# Patient Record
Sex: FEMALE | URBAN - METROPOLITAN AREA
[De-identification: names, ages, dates, MRNs, and addresses within clinical notes are randomized per-mention and may not be internally consistent; named-entity substitution may affect disease eponyms.]

---

## 2020-06-03 ENCOUNTER — OFFICE VISIT (OUTPATIENT)
Dept: URBAN - METROPOLITAN AREA CLINIC 98 | Facility: CLINIC | Age: 50
End: 2020-06-03

## 2020-06-03 RX ORDER — PANTOPRAZOLE SODIUM 40 MG/1
TAKE 1 TABLET (40 MG) BY ORAL ROUTE ONCE DAILY FOR 30 DAYS TABLET, DELAYED RELEASE ORAL 1
Qty: 30 | Refills: 3 | Status: ACTIVE | COMMUNITY
Start: 2020-03-16 | End: 2020-07-14

## 2020-06-03 RX ORDER — LISINOPRIL 30 MG/1
TAKE 1 TABLET (30 MG) BY ORAL ROUTE ONCE DAILY TABLET ORAL 1
Qty: 0 | Refills: 0 | Status: ACTIVE | COMMUNITY
Start: 1900-01-01 | End: 1900-01-01

## 2020-06-03 RX ORDER — OMEPRAZOLE 40 MG/1
TAKE 1 CAPSULE (40 MG) BY ORAL ROUTE ONCE DAILY BEFORE A MEAL CAPSULE, DELAYED RELEASE PELLETS ORAL 1
Qty: 0 | Refills: 0 | Status: ACTIVE | COMMUNITY
Start: 1900-01-01 | End: 1900-01-01

## 2020-06-08 ENCOUNTER — ERX REFILL RESPONSE (OUTPATIENT)
Age: 50
End: 2020-06-08

## 2020-06-08 RX ORDER — PANTOPRAZOLE SODIUM 40 MG/1
TAKE 1 TABLET BY MOUTH EVERY DAY TABLET, DELAYED RELEASE ORAL
Qty: 30 | Refills: 3 | OUTPATIENT

## 2020-06-08 RX ORDER — PANTOPRAZOLE SODIUM 40 MG/1
TAKE 1 TABLET BY MOUTH EVERY DAY TABLET, DELAYED RELEASE ORAL
Qty: 90 TABLET | Refills: 2 | OUTPATIENT

## 2020-07-07 ENCOUNTER — OFFICE VISIT (OUTPATIENT)
Dept: URBAN - METROPOLITAN AREA CLINIC 98 | Facility: CLINIC | Age: 50
End: 2020-07-07
Payer: SELF-PAY

## 2020-07-07 DIAGNOSIS — K57.30 COLON, DIVERTICULOSIS: ICD-10-CM

## 2020-07-07 DIAGNOSIS — K21.0 GASTROESOPHAGEAL REFLUX DISEASE (GERD): ICD-10-CM

## 2020-07-07 PROCEDURE — 99214 OFFICE O/P EST MOD 30 MIN: CPT | Performed by: INTERNAL MEDICINE

## 2020-07-07 PROCEDURE — G8420 CALC BMI NORM PARAMETERS: HCPCS | Performed by: INTERNAL MEDICINE

## 2020-07-07 PROCEDURE — G8427 DOCREV CUR MEDS BY ELIG CLIN: HCPCS | Performed by: INTERNAL MEDICINE

## 2020-07-07 PROCEDURE — G9903 PT SCRN TBCO ID AS NON USER: HCPCS | Performed by: INTERNAL MEDICINE

## 2020-07-07 PROCEDURE — 3017F COLORECTAL CA SCREEN DOC REV: CPT | Performed by: INTERNAL MEDICINE

## 2020-07-07 PROCEDURE — 1036F TOBACCO NON-USER: CPT | Performed by: INTERNAL MEDICINE

## 2020-07-07 RX ORDER — PANTOPRAZOLE SODIUM 40 MG/1
TAKE 1 TABLET BY MOUTH EVERY DAY TABLET, DELAYED RELEASE ORAL
Qty: 90 TABLET | Refills: 2 | Status: ACTIVE | COMMUNITY

## 2020-07-07 RX ORDER — LISINOPRIL 30 MG/1
TAKE 1 TABLET (30 MG) BY ORAL ROUTE ONCE DAILY TABLET ORAL 1
Qty: 0 | Refills: 0 | Status: ACTIVE | COMMUNITY
Start: 1900-01-01 | End: 1900-01-01

## 2020-07-07 RX ORDER — OMEPRAZOLE 40 MG/1
TAKE 1 CAPSULE (40 MG) BY ORAL ROUTE ONCE DAILY BEFORE A MEAL CAPSULE, DELAYED RELEASE PELLETS ORAL 1
Qty: 0 | Refills: 0 | Status: ACTIVE | COMMUNITY
Start: 1900-01-01 | End: 1900-01-01

## 2020-07-07 NOTE — HPI-TODAY'S VISIT:
50 yo patient for abdominal pain follow up. She has no c/o abdominal pain nor jim GHASSAN sxs on diet and Pantoprazole. She does c/o globus sensation while eating w/o dysphagia / odynopphagia. She has been compliant w/  diet and Rx. Admits being under significant stress. No new c/o since prior visit. EGD: GERD, reactive Hp-negative gastritis and  normal duodenal bx's. Colonoscopy: pan-diverticulosis and small hrrds.

## 2020-09-08 ENCOUNTER — OFFICE VISIT (OUTPATIENT)
Dept: URBAN - METROPOLITAN AREA CLINIC 98 | Facility: CLINIC | Age: 50
End: 2020-09-08

## 2020-09-08 ENCOUNTER — OFFICE VISIT (OUTPATIENT)
Dept: URBAN - METROPOLITAN AREA TELEHEALTH 2 | Facility: TELEHEALTH | Age: 50
End: 2020-09-08
Payer: SELF-PAY

## 2020-09-08 DIAGNOSIS — K21.0 GASTROESOPHAGEAL REFLUX DISEASE (GERD): ICD-10-CM

## 2020-09-08 PROCEDURE — G8427 DOCREV CUR MEDS BY ELIG CLIN: HCPCS | Performed by: INTERNAL MEDICINE

## 2020-09-08 PROCEDURE — 3017F COLORECTAL CA SCREEN DOC REV: CPT | Performed by: INTERNAL MEDICINE

## 2020-09-08 PROCEDURE — G9903 PT SCRN TBCO ID AS NON USER: HCPCS | Performed by: INTERNAL MEDICINE

## 2020-09-08 PROCEDURE — G8422 PT INELIG BMI CALCULATION: HCPCS | Performed by: INTERNAL MEDICINE

## 2020-09-08 PROCEDURE — 1036F TOBACCO NON-USER: CPT | Performed by: INTERNAL MEDICINE

## 2020-09-08 PROCEDURE — 99214 OFFICE O/P EST MOD 30 MIN: CPT | Performed by: INTERNAL MEDICINE

## 2020-09-08 RX ORDER — OMEPRAZOLE 40 MG/1
TAKE 1 CAPSULE (40 MG) BY ORAL ROUTE ONCE DAILY BEFORE A MEAL CAPSULE, DELAYED RELEASE PELLETS ORAL 1
Qty: 0 | Refills: 0 | Status: ACTIVE | COMMUNITY
Start: 1900-01-01

## 2020-09-08 RX ORDER — PANTOPRAZOLE SODIUM 40 MG/1
TAKE 1 TABLET BY MOUTH EVERY DAY TABLET, DELAYED RELEASE ORAL
Qty: 90 TABLET | Refills: 2 | Status: ACTIVE | COMMUNITY

## 2020-09-08 RX ORDER — LISINOPRIL 30 MG/1
TAKE 1 TABLET (30 MG) BY ORAL ROUTE ONCE DAILY TABLET ORAL 1
Qty: 0 | Refills: 0 | Status: ACTIVE | COMMUNITY
Start: 1900-01-01

## 2020-09-08 NOTE — HPI-TODAY'S VISIT:
This is a Telephone OV to which patient has agreed to. 48 yo pt w/ GHASSAN here for F/u. She has been feeling better, no abdominal pain, less GHASSAN sxs, some residual sore throat p-prandial w/o dysphagia / odynophagia and no change in bowel pattern. Denies fever or chills. About two weeks ago, she had not feeeling well overall. Had a + COVID-19 test and has been quarantine since then; this week is her second week. She has  no fever, chills, dyspnea, cough nor pleuritic pain. Her son tested + for COVID-19 as well ans is quarantinened at home in  a different room . She has been on Pantoprazole qd. Recent EGD: GERD w/o BE, Hp-negative reactive gastritis, fundic gland polyp and normal duodenal bx's. Colonoscopy 3/20: pandiverticulosis. No other complaints after extensive ROS.

## 2020-10-22 PROBLEM — 60728008 BLOATING: Status: ACTIVE | Noted: 2020-10-22

## 2020-10-24 ENCOUNTER — OFFICE VISIT (OUTPATIENT)
Dept: URBAN - METROPOLITAN AREA TELEHEALTH 2 | Facility: TELEHEALTH | Age: 50
End: 2020-10-24
Payer: SELF-PAY

## 2020-10-24 DIAGNOSIS — K57.30 COLON, DIVERTICULOSIS: ICD-10-CM

## 2020-10-24 DIAGNOSIS — K21.9 GERD (GASTROESOPHAGEAL REFLUX DISEASE): ICD-10-CM

## 2020-10-24 PROCEDURE — G9903 PT SCRN TBCO ID AS NON USER: HCPCS | Performed by: INTERNAL MEDICINE

## 2020-10-24 PROCEDURE — G8484 FLU IMMUNIZE NO ADMIN: HCPCS | Performed by: INTERNAL MEDICINE

## 2020-10-24 PROCEDURE — G8427 DOCREV CUR MEDS BY ELIG CLIN: HCPCS | Performed by: INTERNAL MEDICINE

## 2020-10-24 PROCEDURE — G8422 PT INELIG BMI CALCULATION: HCPCS | Performed by: INTERNAL MEDICINE

## 2020-10-24 PROCEDURE — 1036F TOBACCO NON-USER: CPT | Performed by: INTERNAL MEDICINE

## 2020-10-24 PROCEDURE — 99213 OFFICE O/P EST LOW 20 MIN: CPT | Performed by: INTERNAL MEDICINE

## 2020-10-24 PROCEDURE — 3017F COLORECTAL CA SCREEN DOC REV: CPT | Performed by: INTERNAL MEDICINE

## 2020-10-24 RX ORDER — LISINOPRIL 30 MG/1
TAKE 1 TABLET (30 MG) BY ORAL ROUTE ONCE DAILY TABLET ORAL 1
Qty: 0 | Refills: 0 | Status: ACTIVE | COMMUNITY
Start: 1900-01-01

## 2020-10-24 RX ORDER — PANTOPRAZOLE SODIUM 40 MG/1
TAKE 1 TABLET BY MOUTH EVERY DAY TABLET, DELAYED RELEASE ORAL
Qty: 90 TABLET | Refills: 2 | Status: ACTIVE | COMMUNITY

## 2020-10-24 RX ORDER — OMEPRAZOLE 40 MG/1
TAKE 1 CAPSULE (40 MG) BY ORAL ROUTE ONCE DAILY BEFORE A MEAL CAPSULE, DELAYED RELEASE PELLETS ORAL 1
Qty: 0 | Refills: 0 | Status: ACTIVE | COMMUNITY
Start: 1900-01-01

## 2020-10-24 RX ORDER — PANTOPRAZOLE SODIUM 40 MG/1
1 TABLET TABLET, DELAYED RELEASE ORAL ONCE A DAY
Qty: 30 | Refills: 3 | OUTPATIENT
Start: 2020-10-24

## 2020-10-24 NOTE — HPI-TODAY'S VISIT:
This is a Telephone OV to which patient has agreed to. 48 yo pt w/ GHASSAN, w/o alarm nor extraesophageal sxs, much better w/ diet and Pantoprazole qd. Mild p-prandial gas and distention. No constitutional sxs. No melenic stools. Compliant w/ diet. No other complaints. Recent EGD: GERD w/o BE, Hp-negative reactive gastritis and normal duodenal bx's. No constitutional sxs and denies anorexia or weight loss. No fever nor cardiorespiratory sxs. Has completed quarantine after her son was COVID-19 +. She did not develop any sxs. Colonoscopy 3/20: pandiverticulosis. No constipation on high fiber diet.  No other complaints after extensive ROS.

## 2020-12-07 ENCOUNTER — OFFICE VISIT (OUTPATIENT)
Dept: URBAN - METROPOLITAN AREA CLINIC 98 | Facility: CLINIC | Age: 50
End: 2020-12-07

## 2021-01-21 ENCOUNTER — ERX REFILL RESPONSE (OUTPATIENT)
Dept: URBAN - METROPOLITAN AREA TELEHEALTH 2 | Facility: TELEHEALTH | Age: 51
End: 2021-01-21

## 2021-01-21 RX ORDER — PANTOPRAZOLE SODIUM 40 MG/1
1 TABLET TABLET, DELAYED RELEASE ORAL ONCE A DAY
Qty: 90 | Refills: 1

## 2021-05-02 ENCOUNTER — P2P PATIENT RECORD (OUTPATIENT)
Age: 51
End: 2021-05-02

## 2021-08-20 ENCOUNTER — TELEPHONE ENCOUNTER (OUTPATIENT)
Dept: URBAN - METROPOLITAN AREA CLINIC 98 | Facility: CLINIC | Age: 51
End: 2021-08-20

## 2021-08-20 RX ORDER — PANTOPRAZOLE SODIUM 40 MG/1
1 TABLET TABLET, DELAYED RELEASE ORAL ONCE A DAY
Qty: 90 TABLET | Refills: 3

## 2021-10-02 ENCOUNTER — OFFICE VISIT (OUTPATIENT)
Dept: URBAN - METROPOLITAN AREA TELEHEALTH 2 | Facility: TELEHEALTH | Age: 51
End: 2021-10-02
Payer: SELF-PAY

## 2021-10-02 DIAGNOSIS — K21.9 GERD (GASTROESOPHAGEAL REFLUX DISEASE): ICD-10-CM

## 2021-10-02 DIAGNOSIS — K57.30 COLON, DIVERTICULOSIS: ICD-10-CM

## 2021-10-02 PROCEDURE — 99214 OFFICE O/P EST MOD 30 MIN: CPT | Performed by: INTERNAL MEDICINE

## 2021-10-02 RX ORDER — OMEPRAZOLE 40 MG/1
TAKE 1 CAPSULE (40 MG) BY ORAL ROUTE ONCE DAILY BEFORE A MEAL CAPSULE, DELAYED RELEASE PELLETS ORAL 1
Qty: 0 | Refills: 0 | Status: ACTIVE | COMMUNITY
Start: 1900-01-01

## 2021-10-02 RX ORDER — LISINOPRIL 30 MG/1
TAKE 1 TABLET (30 MG) BY ORAL ROUTE ONCE DAILY TABLET ORAL 1
Qty: 0 | Refills: 0 | Status: ACTIVE | COMMUNITY
Start: 1900-01-01

## 2021-10-02 RX ORDER — LANSOPRAZOLE 30 MG
1 CAPSULE BEFORE A MEAL CAPSULE,DELAYED RELEASE (ENTERIC COATED) ORAL ONCE A DAY
Qty: 30 | Refills: 3 | OUTPATIENT
Start: 2021-10-03

## 2021-10-02 RX ORDER — PANTOPRAZOLE SODIUM 40 MG/1
1 TABLET TABLET, DELAYED RELEASE ORAL ONCE A DAY
Qty: 90 TABLET | Refills: 3 | Status: ACTIVE | COMMUNITY

## 2021-10-02 NOTE — HPI-TODAY'S VISIT:
This is a Telehealth  OV to which patient has agreed to.  Limitations of telehealth discussed; she understands and agrees to proceed. 51 yo pt w/ GHASSAN, w/o alarm nor extraesophageal sxs, much better w/ diet and Pantoprazole qd.  EGD: GERD, sm HH , chronic Hp-negaive gastritis. Has been c/o of globus sensation, w/ occasional sore throat and no dysphagia / odynophagia. Mild p-prandial gas, burping, throat clearing  and distention w/o N/V. Has been under significant stress lately. No constitutional sxs. No melenic stools. Compliant w/ healthy diet. Pantoprazole ineffective in controlling sxs. No other complaints.. No fever nor cardiorespiratory sxs Colonoscopy 3/20: pandiverticulosis. No constipation on high fiber diet.  No other complaints after extensive ROS. Mild COVID-19 9/20 and has received 2 doses of COVID-19 vaccine.

## 2021-10-04 ENCOUNTER — TELEPHONE ENCOUNTER (OUTPATIENT)
Dept: URBAN - METROPOLITAN AREA CLINIC 98 | Facility: CLINIC | Age: 51
End: 2021-10-04

## 2021-10-26 ENCOUNTER — OFFICE VISIT (OUTPATIENT)
Dept: URBAN - METROPOLITAN AREA TELEHEALTH 2 | Facility: TELEHEALTH | Age: 51
End: 2021-10-26
Payer: SELF-PAY

## 2021-10-26 DIAGNOSIS — K21.9 GERD (GASTROESOPHAGEAL REFLUX DISEASE): ICD-10-CM

## 2021-10-26 DIAGNOSIS — K57.30 COLON, DIVERTICULOSIS: ICD-10-CM

## 2021-10-26 PROCEDURE — 99214 OFFICE O/P EST MOD 30 MIN: CPT | Performed by: INTERNAL MEDICINE

## 2021-10-26 RX ORDER — LISINOPRIL 30 MG/1
TAKE 1 TABLET (30 MG) BY ORAL ROUTE ONCE DAILY TABLET ORAL 1
Qty: 0 | Refills: 0 | Status: ACTIVE | COMMUNITY
Start: 1900-01-01

## 2021-10-26 RX ORDER — PANTOPRAZOLE SODIUM 40 MG/1
1 TABLET TABLET, DELAYED RELEASE ORAL ONCE A DAY
Qty: 90 TABLET | Refills: 3 | Status: ACTIVE | COMMUNITY

## 2021-10-26 RX ORDER — LANSOPRAZOLE 30 MG
1 CAPSULE BEFORE A MEAL CAPSULE,DELAYED RELEASE (ENTERIC COATED) ORAL ONCE A DAY
Qty: 30 | Refills: 3 | Status: ACTIVE | COMMUNITY
Start: 2021-10-03

## 2021-10-26 RX ORDER — PANTOPRAZOLE SODIUM 40 MG/1
1 TABLET TABLET, DELAYED RELEASE ORAL ONCE A DAY
Qty: 30 | Refills: 3 | OUTPATIENT
Start: 2021-10-29

## 2021-10-26 RX ORDER — OMEPRAZOLE 40 MG/1
TAKE 1 CAPSULE (40 MG) BY ORAL ROUTE ONCE DAILY BEFORE A MEAL CAPSULE, DELAYED RELEASE PELLETS ORAL 1
Qty: 0 | Refills: 0 | Status: ACTIVE | COMMUNITY
Start: 1900-01-01

## 2021-10-26 NOTE — HPI-TODAY'S VISIT:
This is a Telehealth  OV to which patient has agreed to.  Limitations of telehealth discussed; she understands and agrees to proceed. 51 yo pt w/ GHASSAN, w/o alarm nor extraesophageal sxs, much better w/ diet and Pantoprazole qd. Occasional globus sensation wo dysphagia / odynophagia.   EGD 3/20: GERD, sm HH , chronic Hp-negative gastritis.  Mild p-prandial gas, burping, throat clearing  and distention w/o N/V. Has been under significant stress lately. No constitutional sxs. No melenic stools. Compliant w/ healthy diet. Pantoprazole ineffective in controlling sxs. No other complaints.. No fever nor cardiorespiratory sxs Colonoscopy 3/20: pandiverticulosis. No constipation on high fiber diet.  No other complaints after extensive ROS. Mild COVID-19 9/20 and has received 2 doses of COVID-19 vaccine.

## 2021-11-01 ENCOUNTER — TELEPHONE ENCOUNTER (OUTPATIENT)
Dept: URBAN - METROPOLITAN AREA CLINIC 92 | Facility: CLINIC | Age: 51
End: 2021-11-01

## 2021-11-01 RX ORDER — LANSOPRAZOLE 30 MG
1 CAPSULE BEFORE A MEAL CAPSULE,DELAYED RELEASE (ENTERIC COATED) ORAL ONCE A DAY
Qty: 30 | Refills: 3 | OUTPATIENT
Start: 2021-11-01

## 2021-11-16 ENCOUNTER — OFFICE VISIT (OUTPATIENT)
Dept: URBAN - METROPOLITAN AREA CLINIC 97 | Facility: CLINIC | Age: 51
End: 2021-11-16
Payer: SELF-PAY

## 2021-11-16 ENCOUNTER — TELEPHONE ENCOUNTER (OUTPATIENT)
Dept: URBAN - METROPOLITAN AREA CLINIC 98 | Facility: CLINIC | Age: 51
End: 2021-11-16

## 2021-11-16 DIAGNOSIS — K76.0 FATTY LIVER: ICD-10-CM

## 2021-11-16 DIAGNOSIS — K21.9 GERD: ICD-10-CM

## 2021-11-16 PROCEDURE — 76700 US EXAM ABDOM COMPLETE: CPT | Performed by: INTERNAL MEDICINE

## 2021-11-16 RX ORDER — OMEPRAZOLE 40 MG/1
TAKE 1 CAPSULE (40 MG) BY ORAL ROUTE ONCE DAILY BEFORE A MEAL CAPSULE, DELAYED RELEASE PELLETS ORAL 1
Qty: 0 | Refills: 0 | Status: ACTIVE | COMMUNITY
Start: 1900-01-01

## 2021-11-16 RX ORDER — LANSOPRAZOLE 30 MG
1 CAPSULE BEFORE A MEAL CAPSULE,DELAYED RELEASE (ENTERIC COATED) ORAL ONCE A DAY
Qty: 30 | Refills: 3 | Status: ACTIVE | COMMUNITY
Start: 2021-11-01

## 2021-11-16 RX ORDER — LISINOPRIL 30 MG/1
TAKE 1 TABLET (30 MG) BY ORAL ROUTE ONCE DAILY TABLET ORAL 1
Qty: 0 | Refills: 0 | Status: ACTIVE | COMMUNITY
Start: 1900-01-01

## 2021-11-16 RX ORDER — PANTOPRAZOLE SODIUM 40 MG/1
1 TABLET TABLET, DELAYED RELEASE ORAL ONCE A DAY
Qty: 90 TABLET | Refills: 3 | Status: ACTIVE | COMMUNITY

## 2021-11-16 RX ORDER — PANTOPRAZOLE SODIUM 40 MG/1
1 TABLET TABLET, DELAYED RELEASE ORAL ONCE A DAY
Qty: 30 | Refills: 3 | Status: ACTIVE | COMMUNITY
Start: 2021-10-29

## 2021-11-16 RX ORDER — LANSOPRAZOLE 30 MG
1 CAPSULE BEFORE A MEAL CAPSULE,DELAYED RELEASE (ENTERIC COATED) ORAL ONCE A DAY
Qty: 30 | Refills: 3 | Status: ACTIVE | COMMUNITY
Start: 2021-10-03

## 2022-02-01 ENCOUNTER — TELEPHONE ENCOUNTER (OUTPATIENT)
Dept: URBAN - METROPOLITAN AREA CLINIC 98 | Facility: CLINIC | Age: 52
End: 2022-02-01

## 2022-02-01 RX ORDER — ESOMEPRAZOLE MAGNESIUM 40 MG/1
1 CAPSULE CAPSULE, DELAYED RELEASE ORAL ONCE A DAY
Qty: 30 | Refills: 3 | OUTPATIENT
Start: 2022-02-03

## 2022-02-03 ENCOUNTER — TELEPHONE ENCOUNTER (OUTPATIENT)
Dept: URBAN - METROPOLITAN AREA CLINIC 98 | Facility: CLINIC | Age: 52
End: 2022-02-03

## 2023-01-18 ENCOUNTER — OFFICE VISIT (OUTPATIENT)
Dept: URBAN - METROPOLITAN AREA CLINIC 98 | Facility: CLINIC | Age: 53
End: 2023-01-18

## 2023-01-28 ENCOUNTER — OFFICE VISIT (OUTPATIENT)
Dept: URBAN - METROPOLITAN AREA TELEHEALTH 2 | Facility: TELEHEALTH | Age: 53
End: 2023-01-28
Payer: SELF-PAY

## 2023-01-28 DIAGNOSIS — K57.30 COLON, DIVERTICULOSIS: ICD-10-CM

## 2023-01-28 DIAGNOSIS — K21.9 GERD (GASTROESOPHAGEAL REFLUX DISEASE): ICD-10-CM

## 2023-01-28 DIAGNOSIS — K76.0 NAFLD (NONALCOHOLIC FATTY LIVER DISEASE): ICD-10-CM

## 2023-01-28 PROBLEM — 235595009 GASTROESOPHAGEAL REFLUX DISEASE: Status: ACTIVE | Noted: 2020-10-24

## 2023-01-28 PROBLEM — 1231824009: Status: ACTIVE | Noted: 2023-01-28

## 2023-01-28 PROBLEM — 398050005 DIVERTICULAR DISEASE OF COLON: Status: ACTIVE | Noted: 2020-10-24

## 2023-01-28 PROCEDURE — 99214 OFFICE O/P EST MOD 30 MIN: CPT | Performed by: INTERNAL MEDICINE

## 2023-01-28 RX ORDER — FAMOTIDINE 20 MG
1 CAPSULE TABLET ORAL ONCE A DAY
Status: ACTIVE | COMMUNITY

## 2023-01-28 RX ORDER — ESOMEPRAZOLE MAGNESIUM 40 MG/1
1 CAPSULE CAPSULE, DELAYED RELEASE ORAL ONCE A DAY
Qty: 30 | Refills: 3 | OUTPATIENT
Start: 2023-01-28

## 2023-01-28 RX ORDER — LISINOPRIL 30 MG/1
TAKE 1 TABLET (30 MG) BY ORAL ROUTE ONCE DAILY TABLET ORAL 1
Qty: 0 | Refills: 0 | Status: ACTIVE | COMMUNITY
Start: 1900-01-01

## 2023-01-28 RX ORDER — OMEPRAZOLE 40 MG/1
TAKE 1 CAPSULE (40 MG) BY ORAL ROUTE ONCE DAILY BEFORE A MEAL CAPSULE, DELAYED RELEASE PELLETS ORAL 1
Qty: 0 | Refills: 0 | Status: ACTIVE | COMMUNITY
Start: 1900-01-01

## 2023-01-28 NOTE — HPI-TODAY'S VISIT:
This is a Telehealth  OV to which patient has agreed to.  Limitations of telehealth discussed; she understands and agrees to proceed. Patient's  @ home during this virtual OV. 51 yo pt w/  chronic GERD here for follow up. Still w intermittent GHASSAN sxs, controlled partially  w Omeprazole 20 mg /d. Has no  alarm nor extraesophageal sxs. Occasional globus sensation wo dysphagia / odynophagia.   Previously, GHASSAN sxs well controlled w diet and Esomeprazole. EGD 3/20: GERD, sm HH , chronic Hp-negative gastritis and normal duodenal bx's. .  Mild p-prandial gas, burping, throat clearing  and distention w/o N/V. Has been under significant stress lately. No constitutional sxs. No melenic stools w regular formed  bm's. No anorexia or weight loss. Compliant w/ healthy diet. No fever nor cardiorespiratory sxs. CPE normal 3 mos ago, normal x for HLD on Atorvastatin. Colonoscopy 3/20: pandiverticulosis. No constipation on high fiber diet.  No other complaints after extensive ROS. Mild COVID-19 9/20 and has received 2 doses of COVID-19 vaccine.

## 2023-06-21 ENCOUNTER — TELEPHONE ENCOUNTER (OUTPATIENT)
Dept: URBAN - METROPOLITAN AREA CLINIC 98 | Facility: CLINIC | Age: 53
End: 2023-06-21

## 2023-06-21 RX ORDER — ESOMEPRAZOLE MAGNESIUM 40 MG/1
1 CAPSULE CAPSULE, DELAYED RELEASE ORAL ONCE A DAY
Qty: 30
Start: 2023-01-28

## 2023-07-05 ENCOUNTER — TELEPHONE ENCOUNTER (OUTPATIENT)
Dept: URBAN - METROPOLITAN AREA CLINIC 98 | Facility: CLINIC | Age: 53
End: 2023-07-05

## 2023-07-05 RX ORDER — LANSOPRAZOLE 30 MG/1
1 CAPSULE BEFORE A MEAL CAPSULE, DELAYED RELEASE ORAL ONCE A DAY
Qty: 30 | Refills: 3 | OUTPATIENT
Start: 2023-07-06

## 2023-07-11 ENCOUNTER — TELEPHONE ENCOUNTER (OUTPATIENT)
Dept: URBAN - METROPOLITAN AREA CLINIC 98 | Facility: CLINIC | Age: 53
End: 2023-07-11

## 2023-07-11 RX ORDER — DEXLANSOPRAZOLE 30 MG/1
1 CAPSULE CAPSULE, DELAYED RELEASE ORAL ONCE A DAY
Qty: 30 | Refills: 3 | OUTPATIENT
Start: 2023-07-18

## 2023-07-11 RX ORDER — ESOMEPRAZOLE MAGNESIUM 40 MG/1
1 CAPSULE CAPSULE, DELAYED RELEASE ORAL ONCE A DAY
Qty: 30
Start: 2023-01-28

## 2023-08-04 ENCOUNTER — ERX REFILL RESPONSE (OUTPATIENT)
Dept: URBAN - METROPOLITAN AREA CLINIC 98 | Facility: CLINIC | Age: 53
End: 2023-08-04

## 2023-08-04 RX ORDER — LANSOPRAZOLE 30 MG/1
TAKE 1 CAPSULE BY MOUTH EVERY DAY BEFORE A MEAL CAPSULE, DELAYED RELEASE ORAL
Qty: 30 CAPSULE | Refills: 3 | OUTPATIENT

## 2023-08-04 RX ORDER — LANSOPRAZOLE 30 MG/1
1 CAPSULE BEFORE A MEAL CAPSULE, DELAYED RELEASE ORAL ONCE A DAY
Qty: 30 | Refills: 3 | OUTPATIENT

## 2023-08-23 ENCOUNTER — OFFICE VISIT (OUTPATIENT)
Dept: URBAN - METROPOLITAN AREA CLINIC 98 | Facility: CLINIC | Age: 53
End: 2023-08-23
Payer: SELF-PAY

## 2023-08-23 VITALS
WEIGHT: 143 LBS | BODY MASS INDEX: 28.07 KG/M2 | SYSTOLIC BLOOD PRESSURE: 118 MMHG | TEMPERATURE: 98.6 F | HEART RATE: 81 BPM | HEIGHT: 60 IN | DIASTOLIC BLOOD PRESSURE: 77 MMHG

## 2023-08-23 DIAGNOSIS — K21.9 GERD (GASTROESOPHAGEAL REFLUX DISEASE): ICD-10-CM

## 2023-08-23 PROCEDURE — 99213 OFFICE O/P EST LOW 20 MIN: CPT | Performed by: INTERNAL MEDICINE

## 2023-08-23 RX ORDER — FAMOTIDINE 20 MG
1 CAPSULE TABLET ORAL ONCE A DAY
Status: ACTIVE | COMMUNITY

## 2023-08-23 RX ORDER — LISINOPRIL 30 MG/1
TAKE 1 TABLET (30 MG) BY ORAL ROUTE ONCE DAILY TABLET ORAL 1
Qty: 0 | Refills: 0 | Status: ACTIVE | COMMUNITY
Start: 1900-01-01

## 2023-08-23 RX ORDER — LANSOPRAZOLE 30 MG/1
TAKE 1 CAPSULE BY MOUTH EVERY DAY BEFORE A MEAL CAPSULE, DELAYED RELEASE ORAL
Qty: 30 CAPSULE | Refills: 3 | Status: ACTIVE | COMMUNITY

## 2023-08-23 RX ORDER — DEXLANSOPRAZOLE 30 MG/1
1 CAPSULE CAPSULE, DELAYED RELEASE ORAL ONCE A DAY
Qty: 30 | Refills: 3 | Status: ACTIVE | COMMUNITY
Start: 2023-07-18

## 2023-08-23 RX ORDER — OMEPRAZOLE 40 MG/1
TAKE 1 CAPSULE (40 MG) BY ORAL ROUTE ONCE DAILY BEFORE A MEAL CAPSULE, DELAYED RELEASE PELLETS ORAL 1
Qty: 0 | Refills: 0 | Status: ACTIVE | COMMUNITY
Start: 1900-01-01

## 2023-08-23 RX ORDER — LANSOPRAZOLE 30 MG/1
1 CAPSULE BEFORE A MEAL CAPSULE, DELAYED RELEASE ORAL ONCE A DAY
Qty: 90 CAPSULE | Refills: 3 | OUTPATIENT
Start: 2023-08-25

## 2023-08-23 RX ORDER — ESOMEPRAZOLE MAGNESIUM 40 MG/1
1 CAPSULE CAPSULE, DELAYED RELEASE ORAL ONCE A DAY
Qty: 30 | Status: ACTIVE | COMMUNITY
Start: 2023-01-28

## 2023-08-23 NOTE — HPI-TODAY'S VISIT:
51 yo pt w/  chronic GERD here for follow up. On Lanzoprazole 30 mg po q d ac BF: much better, no oral sxs, less GHASSAN, occasional globus, and intermittent p-prandial epigastric pain. Has no  alarm, extraesophageal sxs, dysphagia / odynophagia. EGD 3/20: GERD, sm HH , chronic Hp-negative gastritis and normal duodenal bx's. .  Mild p-prandial gas, burping, throat clearing  and distention w/o N/V. Has been under significant stress lately. No constitutional sxs. No melenic stools w regular formed  bm's. No anorexia or weight loss. Compliant w/ healthy diet. Labs : , , D3 21 and normal CBC, CMP.  No fever nor cardiorespiratory sxs. Colonoscopy 3/20: pandiverticulosis. No constipation on high fiber diet.  No other complaints after extensive ROS.

## 2024-01-10 ENCOUNTER — OFFICE VISIT (OUTPATIENT)
Dept: URBAN - METROPOLITAN AREA CLINIC 98 | Facility: CLINIC | Age: 54
End: 2024-01-10
Payer: SELF-PAY

## 2024-01-10 VITALS
SYSTOLIC BLOOD PRESSURE: 109 MMHG | WEIGHT: 153.2 LBS | HEART RATE: 81 BPM | DIASTOLIC BLOOD PRESSURE: 76 MMHG | BODY MASS INDEX: 30.08 KG/M2 | TEMPERATURE: 97.7 F | HEIGHT: 60 IN

## 2024-01-10 DIAGNOSIS — A08.4 VIRAL GASTROENTERITIS: ICD-10-CM

## 2024-01-10 DIAGNOSIS — K21.9 GERD (GASTROESOPHAGEAL REFLUX DISEASE): ICD-10-CM

## 2024-01-10 PROCEDURE — 99214 OFFICE O/P EST MOD 30 MIN: CPT | Performed by: INTERNAL MEDICINE

## 2024-01-10 RX ORDER — ESOMEPRAZOLE MAGNESIUM 40 MG/1
1 CAPSULE CAPSULE, DELAYED RELEASE ORAL ONCE A DAY
Qty: 30 | Status: ON HOLD | COMMUNITY
Start: 2023-01-28

## 2024-01-10 RX ORDER — FAMOTIDINE 20 MG
1 CAPSULE TABLET ORAL ONCE A DAY
Status: ON HOLD | COMMUNITY

## 2024-01-10 RX ORDER — OMEPRAZOLE 40 MG/1
TAKE 1 CAPSULE (40 MG) BY ORAL ROUTE ONCE DAILY BEFORE A MEAL CAPSULE, DELAYED RELEASE PELLETS ORAL 1
Qty: 0 | Refills: 0 | Status: ON HOLD | COMMUNITY
Start: 1900-01-01

## 2024-01-10 RX ORDER — LISINOPRIL 30 MG/1
TAKE 1 TABLET (30 MG) BY ORAL ROUTE ONCE DAILY TABLET ORAL 1
Qty: 0 | Refills: 0 | Status: ACTIVE | COMMUNITY
Start: 1900-01-01

## 2024-01-10 RX ORDER — LANSOPRAZOLE 30 MG/1
1 CAPSULE BEFORE A MEAL CAPSULE, DELAYED RELEASE ORAL ONCE A DAY
Qty: 90 CAPSULE | Refills: 3 | OUTPATIENT

## 2024-01-10 RX ORDER — LANSOPRAZOLE 30 MG/1
1 CAPSULE BEFORE A MEAL CAPSULE, DELAYED RELEASE ORAL ONCE A DAY
Qty: 90 CAPSULE | Refills: 3 | Status: ON HOLD | COMMUNITY
Start: 2023-08-25

## 2024-01-10 RX ORDER — DEXLANSOPRAZOLE 30 MG/1
1 CAPSULE CAPSULE, DELAYED RELEASE ORAL ONCE A DAY
Qty: 30 | Refills: 3 | Status: ON HOLD | COMMUNITY
Start: 2023-07-18

## 2024-01-10 RX ORDER — LANSOPRAZOLE 30 MG/1
TAKE 1 CAPSULE BY MOUTH EVERY DAY BEFORE A MEAL CAPSULE, DELAYED RELEASE ORAL
Qty: 30 CAPSULE | Refills: 3 | Status: ACTIVE | COMMUNITY

## 2024-01-10 NOTE — HPI-TODAY'S VISIT:
54 yo pt w/  chronic GERD here for follow up. On Lanzoprazole 30 mg po q d ac BF: much better, no oral sxs, less GHASSAN, occasional globus, and intermittent p-prandial epigastric pain. Two weeks ago: low grade fever, nausea, one episode of bilious emesis wo hematemesis / melanemesis and non-bloody diarrhea x 1 week. Now w p-prandial bloating, mild distention, epigastric discomfort and rumbling. Mild anorexia and no weight loss. Gradually getting better.  Has no  alarm, extraesophageal sxs, dysphagia / odynophagia. EGD 3/20: GERD, sm HH, chronic Hp-negative gastritis and normal duodenal bx's. Has been under significant stress lately. No constitutional sxs. No melenic stools w regular formed  bm's. No anorexia or weight loss. Compliant w/ healthy diet. Labs : , , D3 21 and normal CBC, CMP.  No fever nor cardiorespiratory sxs. Colonoscopy 3/20: pandiverticulosis. No constipation on high fiber diet.  No other complaints after extensive ROS.

## 2024-02-20 ENCOUNTER — OV EP (OUTPATIENT)
Dept: URBAN - METROPOLITAN AREA CLINIC 98 | Facility: CLINIC | Age: 54
End: 2024-02-20
Payer: SELF-PAY

## 2024-02-20 VITALS
SYSTOLIC BLOOD PRESSURE: 114 MMHG | BODY MASS INDEX: 27.13 KG/M2 | TEMPERATURE: 98.6 F | WEIGHT: 138.2 LBS | DIASTOLIC BLOOD PRESSURE: 75 MMHG | HEIGHT: 60 IN | HEART RATE: 82 BPM

## 2024-02-20 DIAGNOSIS — K21.9 GERD (GASTROESOPHAGEAL REFLUX DISEASE): ICD-10-CM

## 2024-02-20 PROCEDURE — 99214 OFFICE O/P EST MOD 30 MIN: CPT | Performed by: INTERNAL MEDICINE

## 2024-02-20 RX ORDER — LANSOPRAZOLE 30 MG/1
TAKE 1 CAPSULE BY MOUTH EVERY DAY BEFORE A MEAL CAPSULE, DELAYED RELEASE ORAL
Qty: 30 CAPSULE | Refills: 3 | COMMUNITY

## 2024-02-20 RX ORDER — FAMOTIDINE 20 MG
1 CAPSULE TABLET ORAL ONCE A DAY
Status: ON HOLD | COMMUNITY

## 2024-02-20 RX ORDER — LANSOPRAZOLE 30 MG/1
1 CAPSULE BEFORE A MEAL CAPSULE, DELAYED RELEASE ORAL ONCE A DAY
Qty: 90 CAPSULE | Refills: 3 | OUTPATIENT

## 2024-02-20 RX ORDER — LANSOPRAZOLE 30 MG/1
1 CAPSULE BEFORE A MEAL CAPSULE, DELAYED RELEASE ORAL ONCE A DAY
Qty: 90 CAPSULE | Refills: 3 | COMMUNITY

## 2024-02-20 RX ORDER — OMEPRAZOLE 40 MG/1
TAKE 1 CAPSULE (40 MG) BY ORAL ROUTE ONCE DAILY BEFORE A MEAL CAPSULE, DELAYED RELEASE PELLETS ORAL 1
Qty: 0 | Refills: 0 | Status: ON HOLD | COMMUNITY
Start: 1900-01-01

## 2024-02-20 RX ORDER — ESOMEPRAZOLE MAGNESIUM 40 MG/1
1 CAPSULE CAPSULE, DELAYED RELEASE ORAL ONCE A DAY
Qty: 30 | Status: ON HOLD | COMMUNITY
Start: 2023-01-28

## 2024-02-20 RX ORDER — LISINOPRIL 30 MG/1
TAKE 1 TABLET (30 MG) BY ORAL ROUTE ONCE DAILY TABLET ORAL 1
Qty: 0 | Refills: 0 | Status: ACTIVE | COMMUNITY
Start: 1900-01-01

## 2024-02-20 RX ORDER — DEXLANSOPRAZOLE 30 MG/1
1 CAPSULE CAPSULE, DELAYED RELEASE ORAL ONCE A DAY
Qty: 30 | Refills: 3 | Status: ON HOLD | COMMUNITY
Start: 2023-07-18

## 2024-02-20 NOTE — HPI-TODAY'S VISIT:
54 yo pt w/  chronic GERD here for follow up. Less sxs on Lansoprazole 30 mg po q d ac BF, and low CHO - ADA diet. Now w occasional, diet-related p-prandial bloating, mild distention, epigastric discomfort and rumbling. Mild anorexia and no weight loss. Has no  alarm, extraesophageal sxs, dysphagia / odynophagia. EGD 3/20: GERD, sm HH, chronic Hp-negative gastritis and normal duodenal bx's. Has been under significant stress lately. Labs 1/24" , , HbA1c 6.2 w normal B12, CMP, CBC and TSH.No constitutional sxs. No melenic stools w regular formed  bm's. No anorexia or weight loss. Compliant w/ healthy diet. No fever nor cardiorespiratory sxs. Colonoscopy 3/20: pandiverticulosis. No constipation on high fiber diet.  No other complaints after extensive ROS.

## 2024-08-21 ENCOUNTER — DASHBOARD ENCOUNTERS (OUTPATIENT)
Age: 54
End: 2024-08-21

## 2024-08-21 ENCOUNTER — OFFICE VISIT (OUTPATIENT)
Dept: URBAN - METROPOLITAN AREA CLINIC 98 | Facility: CLINIC | Age: 54
End: 2024-08-21
Payer: SELF-PAY

## 2024-08-21 VITALS
SYSTOLIC BLOOD PRESSURE: 152 MMHG | HEART RATE: 70 BPM | DIASTOLIC BLOOD PRESSURE: 84 MMHG | HEIGHT: 60 IN | TEMPERATURE: 96.8 F | BODY MASS INDEX: 26.31 KG/M2 | WEIGHT: 134 LBS

## 2024-08-21 DIAGNOSIS — R14.0 ABDOMINAL BLOATING: ICD-10-CM

## 2024-08-21 DIAGNOSIS — K21.9 GERD (GASTROESOPHAGEAL REFLUX DISEASE): ICD-10-CM

## 2024-08-21 PROCEDURE — 99214 OFFICE O/P EST MOD 30 MIN: CPT | Performed by: INTERNAL MEDICINE

## 2024-08-21 RX ORDER — LANSOPRAZOLE 30 MG/1
1 CAPSULE BEFORE A MEAL CAPSULE, DELAYED RELEASE ORAL ONCE A DAY
Qty: 90 CAPSULE | Refills: 3 | Status: ACTIVE | COMMUNITY

## 2024-08-21 RX ORDER — ESOMEPRAZOLE MAGNESIUM 40 MG/1
1 CAPSULE CAPSULE, DELAYED RELEASE ORAL ONCE A DAY
Qty: 30 | Status: ON HOLD | COMMUNITY
Start: 2023-01-28

## 2024-08-21 RX ORDER — OMEPRAZOLE 40 MG/1
TAKE 1 CAPSULE (40 MG) BY ORAL ROUTE ONCE DAILY BEFORE A MEAL CAPSULE, DELAYED RELEASE PELLETS ORAL 1
Qty: 0 | Refills: 0 | Status: ON HOLD | COMMUNITY
Start: 1900-01-01

## 2024-08-21 RX ORDER — LANSOPRAZOLE 30 MG/1
1 CAPSULE BEFORE A MEAL CAPSULE, DELAYED RELEASE ORAL ONCE A DAY
Qty: 90 CAPSULE | Refills: 3 | OUTPATIENT

## 2024-08-21 RX ORDER — LISINOPRIL 30 MG/1
TAKE 1 TABLET (30 MG) BY ORAL ROUTE ONCE DAILY TABLET ORAL 1
Qty: 0 | Refills: 0 | Status: ACTIVE | COMMUNITY
Start: 1900-01-01

## 2024-08-21 RX ORDER — FAMOTIDINE 20 MG
1 CAPSULE TABLET ORAL ONCE A DAY
Status: ACTIVE | COMMUNITY

## 2024-08-21 RX ORDER — DEXLANSOPRAZOLE 30 MG/1
1 CAPSULE CAPSULE, DELAYED RELEASE ORAL ONCE A DAY
Qty: 30 | Refills: 3 | Status: ON HOLD | COMMUNITY
Start: 2023-07-18

## 2024-08-21 RX ORDER — ONDANSETRON 4 MG/1
1 TABLET ON THE TONGUE AND ALLOW TO DISSOLVE TABLET, ORALLY DISINTEGRATING ORAL ONCE A DAY
Qty: 30 | Refills: 1 | OUTPATIENT
Start: 2024-08-21

## 2024-08-21 RX ORDER — LANSOPRAZOLE 30 MG/1
TAKE 1 CAPSULE BY MOUTH EVERY DAY BEFORE A MEAL CAPSULE, DELAYED RELEASE ORAL
Qty: 30 CAPSULE | Refills: 3 | COMMUNITY

## 2024-09-17 ENCOUNTER — OFFICE VISIT (OUTPATIENT)
Dept: URBAN - METROPOLITAN AREA TELEHEALTH 2 | Facility: TELEHEALTH | Age: 54
End: 2024-09-17